# Patient Record
Sex: FEMALE | URBAN - METROPOLITAN AREA
[De-identification: names, ages, dates, MRNs, and addresses within clinical notes are randomized per-mention and may not be internally consistent; named-entity substitution may affect disease eponyms.]

---

## 2023-05-26 ENCOUNTER — HOSPITAL ENCOUNTER (OUTPATIENT)
Age: 28
Setting detail: SPECIMEN
Discharge: HOME | End: 2023-05-26

## 2023-06-02 LAB — CYTOLOGY REPORT: NORMAL

## 2024-04-01 ENCOUNTER — TELEPHONE (OUTPATIENT)
Age: 29
End: 2024-04-01

## 2024-04-01 DIAGNOSIS — N91.2 AMENORRHEA: Primary | ICD-10-CM

## 2024-04-01 LAB — HCG QUANTITATIVE: NORMAL

## 2024-04-01 NOTE — TELEPHONE ENCOUNTER
Patient called stating she has had a couple at home pregnancy tests come back positive since Thursday. She is wondering if a blood test can be ordered to double check this.  Sending to you since Dr. Bonilla is not in this week.   [facility] in [place] for lab   Please advise

## 2024-04-01 NOTE — TELEPHONE ENCOUNTER
Order for serum HCG testing put in chart- OK to fax order to [facility] and notify patient when faxed

## 2024-04-02 DIAGNOSIS — N91.2 AMENORRHEA: ICD-10-CM
